# Patient Record
Sex: MALE | Race: WHITE | NOT HISPANIC OR LATINO | Employment: OTHER | URBAN - METROPOLITAN AREA
[De-identification: names, ages, dates, MRNs, and addresses within clinical notes are randomized per-mention and may not be internally consistent; named-entity substitution may affect disease eponyms.]

---

## 2018-02-08 ENCOUNTER — OFFICE VISIT (OUTPATIENT)
Dept: URGENT CARE | Facility: CLINIC | Age: 73
End: 2018-02-08
Payer: MEDICARE

## 2018-02-08 VITALS
DIASTOLIC BLOOD PRESSURE: 92 MMHG | HEIGHT: 68 IN | TEMPERATURE: 98.9 F | HEART RATE: 93 BPM | BODY MASS INDEX: 28.04 KG/M2 | OXYGEN SATURATION: 96 % | SYSTOLIC BLOOD PRESSURE: 126 MMHG | RESPIRATION RATE: 18 BRPM | WEIGHT: 185 LBS

## 2018-02-08 DIAGNOSIS — J01.10 ACUTE FRONTAL SINUSITIS, RECURRENCE NOT SPECIFIED: Primary | ICD-10-CM

## 2018-02-08 PROCEDURE — G0463 HOSPITAL OUTPT CLINIC VISIT: HCPCS | Performed by: PHYSICIAN ASSISTANT

## 2018-02-08 PROCEDURE — 99203 OFFICE O/P NEW LOW 30 MIN: CPT | Performed by: PHYSICIAN ASSISTANT

## 2018-02-08 RX ORDER — AMLODIPINE BESYLATE 5 MG/1
5 TABLET ORAL DAILY
COMMUNITY

## 2018-02-08 RX ORDER — AMOXICILLIN 500 MG/1
500 CAPSULE ORAL EVERY 8 HOURS SCHEDULED
Qty: 30 CAPSULE | Refills: 0 | Status: SHIPPED | OUTPATIENT
Start: 2018-02-08 | End: 2018-02-18

## 2018-02-08 NOTE — PROGRESS NOTES
Assessment/Plan:      Diagnoses and all orders for this visit:    Acute frontal sinusitis, recurrence not specified  -     amoxicillin (AMOXIL) 500 mg capsule; Take 1 capsule (500 mg total) by mouth every 8 (eight) hours for 10 days    Other orders  -     amLODIPine (NORVASC) 5 mg tablet; Take 5 mg by mouth daily        Patient Instructions   Take antibiotic as prescribed  otc meds as needed for sxs control  Follow up with pcp if sxs worsen or persist      Subjective:     Patient ID: Andrew Ornelas is a 68 y o  male  Chief Complaint   Patient presents with    Cold Like Symptoms     started with sinus pain, pressure, nasal congestion, post nasal drip and eyes burning x3 days  Denies fever  URI    This is a new problem  The current episode started in the past 7 days  The problem has been gradually worsening  There has been no fever  Associated symptoms include congestion, coughing, rhinorrhea, sinus pain, sneezing, a sore throat and swollen glands  Pertinent negatives include no abdominal pain, chest pain, diarrhea, dysuria, ear pain, headaches, joint pain, joint swelling, nausea, neck pain, plugged ear sensation, rash, vomiting or wheezing  He has tried nothing for the symptoms  Review of Systems   HENT: Positive for congestion, rhinorrhea, sinus pain, sneezing and sore throat  Negative for ear pain  Respiratory: Positive for cough  Negative for wheezing  Cardiovascular: Negative for chest pain  Gastrointestinal: Negative for abdominal pain, diarrhea, nausea and vomiting  Genitourinary: Negative for dysuria  Musculoskeletal: Negative for joint pain and neck pain  Skin: Negative for rash  Neurological: Negative for headaches           Objective:    /92 (BP Location: Left arm, Patient Position: Sitting, Cuff Size: Adult)   Pulse 93   Temp 98 9 °F (37 2 °C) (Tympanic)   Resp 18   Ht 5' 8" (1 727 m)   Wt 83 9 kg (185 lb)   SpO2 96%   BMI 28 13 kg/m²        Physical Exam Constitutional: Vital signs are normal  He appears well-developed and well-nourished  HENT:   Head: Normocephalic  Right Ear: Hearing, tympanic membrane, external ear and ear canal normal    Left Ear: Hearing, tympanic membrane, external ear and ear canal normal    Nose: Mucosal edema, rhinorrhea (purulent) and sinus tenderness present  Right sinus exhibits maxillary sinus tenderness  Left sinus exhibits maxillary sinus tenderness  Mouth/Throat: Uvula is midline  Posterior oropharyngeal erythema (purulent post nasal discharge) present  No posterior oropharyngeal edema  Cardiovascular: Normal rate, regular rhythm, normal heart sounds and normal pulses  Pulmonary/Chest: Effort normal and breath sounds normal    Lymphadenopathy:     He has cervical adenopathy

## 2018-02-08 NOTE — PATIENT INSTRUCTIONS
Take antibiotic as prescribed  otc meds as needed for sxs control  Follow up with pcp if sxs worsen or persist